# Patient Record
Sex: FEMALE | URBAN - METROPOLITAN AREA
[De-identification: names, ages, dates, MRNs, and addresses within clinical notes are randomized per-mention and may not be internally consistent; named-entity substitution may affect disease eponyms.]

---

## 2017-06-13 ENCOUNTER — APPOINTMENT (RX ONLY)
Dept: URBAN - METROPOLITAN AREA CLINIC 75 | Facility: CLINIC | Age: 47
Setting detail: DERMATOLOGY
End: 2017-06-13

## 2017-06-13 VITALS — HEIGHT: 64 IN | WEIGHT: 145 LBS

## 2017-06-13 DIAGNOSIS — Z71.89 OTHER SPECIFIED COUNSELING: ICD-10-CM

## 2017-06-13 DIAGNOSIS — L82.1 OTHER SEBORRHEIC KERATOSIS: ICD-10-CM

## 2017-06-13 DIAGNOSIS — L81.4 OTHER MELANIN HYPERPIGMENTATION: ICD-10-CM

## 2017-06-13 DIAGNOSIS — L81.1 CHLOASMA: ICD-10-CM

## 2017-06-13 DIAGNOSIS — Z41.9 ENCOUNTER FOR PROCEDURE FOR PURPOSES OTHER THAN REMEDYING HEALTH STATE, UNSPECIFIED: ICD-10-CM

## 2017-06-13 DIAGNOSIS — D22 MELANOCYTIC NEVI: ICD-10-CM

## 2017-06-13 PROBLEM — D23.72 OTHER BENIGN NEOPLASM OF SKIN OF LEFT LOWER LIMB, INCLUDING HIP: Status: ACTIVE | Noted: 2017-06-13

## 2017-06-13 PROBLEM — D22.5 MELANOCYTIC NEVI OF TRUNK: Status: ACTIVE | Noted: 2017-06-13

## 2017-06-13 PROBLEM — L20.84 INTRINSIC (ALLERGIC) ECZEMA: Status: ACTIVE | Noted: 2017-06-13

## 2017-06-13 PROBLEM — E78.5 HYPERLIPIDEMIA, UNSPECIFIED: Status: ACTIVE | Noted: 2017-06-13

## 2017-06-13 PROCEDURE — ? COUNSELING

## 2017-06-13 PROCEDURE — ? RECOMMENDATIONS

## 2017-06-13 PROCEDURE — ? TREATMENT REGIMEN

## 2017-06-13 PROCEDURE — ? IN-HOUSE DISPENSING PHARMACY

## 2017-06-13 PROCEDURE — 99203 OFFICE O/P NEW LOW 30 MIN: CPT

## 2017-06-13 PROCEDURE — ? BOTOX

## 2017-06-13 ASSESSMENT — LOCATION DETAILED DESCRIPTION DERM
LOCATION DETAILED: INFERIOR THORACIC SPINE
LOCATION DETAILED: RIGHT INFERIOR CENTRAL MALAR CHEEK
LOCATION DETAILED: RIGHT SUPERIOR MEDIAL UPPER BACK

## 2017-06-13 ASSESSMENT — LOCATION ZONE DERM
LOCATION ZONE: TRUNK
LOCATION ZONE: FACE

## 2017-06-13 ASSESSMENT — LOCATION SIMPLE DESCRIPTION DERM
LOCATION SIMPLE: RIGHT UPPER BACK
LOCATION SIMPLE: RIGHT CHEEK
LOCATION SIMPLE: UPPER BACK

## 2017-06-13 NOTE — PROCEDURE: IN-HOUSE DISPENSING PHARMACY
Product 53 Units Dispensed: 0
Product 1 Unit Type: mg
Name Of Product 1: Acne Body Wash - 061570
Product 42 Application Directions: Apply to hyperpigmented area in the evening after moisturizer.
Name Of Product 2: Tret 0.05% Cream - [id number]
Product 21 Application Directions: Apply to affected area in the evening or every other evening.
Product 1 Price/Unit (In Dollars): 40.00
Product 21 Price/Unit (In Dollars): 50.00
Render Product Pricing In Note: Yes
Product 41 Units Dispensed: 1
Name Of Product 51: Anti- Fungal Nail Solution - 279910
Name Of Product 4: Acne Gel w/ Dapsone - 103402
Product 12 Application Directions: Apply to affected area two times a day.
Name Of Product 31: Ivermec 1% / Met 1% Gel - 922287
Product 31 Application Directions: Apply to affected area before moisturizer one time a day.
Name Of Product 42: Kojic Melasma Cream - 174145
Product 7 Application Directions: Apply to affected area one time a day.
Name Of Product 5: Clind / Tret Combo Cream - 316278
Product 2 Application Directions: Apply to affected area in the evening after moisturizer.  Avoid eyelids.
Name Of Product 7: Sod Sulf 10% / Sulf 2% - 786632
Name Of Product 8: Taza 0.1% Cream - 843272
Name Of Product 13: Clob 0.05% Cream - 085043
Product 1 Application Directions: Use as face or body wash daily.
Product 8 Application Directions: Apply to affected area in the evening after moisturizer.  Avoid eyelids.
Detail Level: Zone
Product 51 Application Directions: Apply to affected nails daily for 10 months.
Name Of Product 35: Tacro 0.1% Ointment - 780469
Name Of Product 41: Hydroquin 6% Combo Cream - 532020
Name Of Product 6: Adap Combo Cream - 571970
Product 3 Application Directions: Apply to acne prone area after moisturizer one time a day.
Product 5 Application Directions: Apply to affected area in evening after moisturizer. Avoid eyelids.
Name Of Product 21: Imiqui 5% / Levo 1% Gel - 148731
Name Of Product 11: Clob 0.05% Solution - 447307
Name Of Product 3: Tiago / Clind Combo - 486423
Name Of Product 24: Triamcin 1% Ointment - 667316
Name Of Product 12: Iodoquin / Darrius Combo - 991199
Name Of Product 14: Dermatitis Topical Foam - 590809

## 2017-06-13 NOTE — PROCEDURE: BOTOX
Glabellar Complex Units: 20
Detail Level: Detailed
Additional Area 6 Units: 0
Lot #: [id number]
Post-Care Instructions: Patient instructed to not lie down for 4 hours and limit physical activity for 24 hours. Patient instructed not to travel by airplane for 48 hours.
Forehead Units: 12
Consent: Written consent obtained. Risks include but not limited to lid/brow ptosis, bruising, swelling, diplopia, temporary effect, incomplete chemical denervation.
Dilution (U/0.1 Cc): 4
Expiration Date (Month Year): 8/19
Price (Use Numbers Only, No Special Characters Or $): 12.00

## 2017-06-13 NOTE — PROCEDURE: RECOMMENDATIONS
Detail Level: Zone
Recommendations (Free Text): [place]roneedling
Recommendation Preamble: The following recommendations were made during the visit:  juvederm ultra plua for tear trough and nl folds for malar area

## 2017-09-28 ENCOUNTER — APPOINTMENT (RX ONLY)
Dept: URBAN - METROPOLITAN AREA CLINIC 75 | Facility: CLINIC | Age: 47
Setting detail: DERMATOLOGY
End: 2017-09-28

## 2017-09-28 DIAGNOSIS — Z41.9 ENCOUNTER FOR PROCEDURE FOR PURPOSES OTHER THAN REMEDYING HEALTH STATE, UNSPECIFIED: ICD-10-CM

## 2017-09-28 PROCEDURE — ? BOTOX

## 2017-09-28 NOTE — PROCEDURE: BOTOX
Price (Use Numbers Only, No Special Characters Or $): 12.00
Additional Area 5 Units: 0
Post-Care Instructions: Patient instructed to not lie down for 4 hours and limit physical activity for 24 hours. Patient instructed not to travel by airplane for 48 hours.
Glabellar Complex Units: 24
Expiration Date (Month Year): 8/19
Consent: Written consent obtained. Risks include but not limited to lid/brow ptosis, bruising, swelling, diplopia, temporary effect, incomplete chemical denervation.
Detail Level: Detailed
Lot #: [id number]
Dilution (U/0.1 Cc): 4

## 2017-10-04 ENCOUNTER — APPOINTMENT (RX ONLY)
Dept: URBAN - METROPOLITAN AREA CLINIC 75 | Facility: CLINIC | Age: 47
Setting detail: DERMATOLOGY
End: 2017-10-04

## 2017-10-04 DIAGNOSIS — L98.8 OTHER SPECIFIED DISORDERS OF THE SKIN AND SUBCUTANEOUS TISSUE: ICD-10-CM

## 2017-10-04 PROBLEM — L90.8 OTHER ATROPHIC DISORDERS OF SKIN: Status: ACTIVE | Noted: 2017-10-04

## 2017-10-04 PROBLEM — L55.1 SUNBURN OF SECOND DEGREE: Status: ACTIVE | Noted: 2017-10-04

## 2017-10-04 PROCEDURE — ? VOLBELLA INJECTION

## 2017-10-04 PROCEDURE — ? JUVEDERM ULTRA PLUS XC INJECTION

## 2017-10-04 NOTE — PROCEDURE: VOLBELLA INJECTION
Additional Area 1 Volume In Cc: 0.6
Filler: Juvederm Volbella XC
Additional Area 2 Volume In Cc: 0
Use Map Statement For Sites (Optional): No
Additional Area 1 Location: lips, upper and lower
Additional Anesthesia Volume In Cc: 6
Map Statment: See Attach Map for Complete Details
Procedural Text: The filler was administered to the treatment areas noted above.
Post-Care Instructions: Patient instructed to apply ice to reduce swelling.
Detail Level: Detailed
Consent: Written consent obtained. Risks include but not limited to bruising, beading, irregular texture, ulceration, infection, allergic reaction, scar formation, incomplete augmentation, temporary nature, procedural pain.
Anesthesia Volume In Cc: 0.5
Topical Anesthesia?: BLT cream (benzocaine 20%, lidocaine 6%, tetracaine 4%)
Expiration Date (Month Year): 2-10-19
Lot #: [id number]

## 2017-10-04 NOTE — PROCEDURE: JUVEDERM ULTRA PLUS XC INJECTION
Map Statment: See Attach Map for Complete Details
Detail Level: Detailed
Jawline Filler Volume In Cc: 0
Consent: Written consent obtained. Risks include but not limited to bruising, beading, irregular texture, ulceration, infection, allergic reaction, scar formation, incomplete augmentation, temporary nature, procedural pain.
Nasolabial Folds Filler Volume In Cc: 0.2
Anesthesia Volume In Cc: 0.5
Procedural Text: The filler was administered to the treatment areas noted above.
Additional Area 1 Location: glabella
Use Map Statement For Sites (Optional): No
Lot #: [id number]
Filler: Juvederm Ultra Plus XC
Expiration Date (Month Year): 8/3/18
Post-Care Instructions: Patient instructed to apply ice to reduce swelling.
Topical Anesthesia?: BLT cream (benzocaine 20%, lidocaine 6%, tetracaine 4%)
Additional Anesthesia Volume In Cc: 6
Tear Troughs Filler  Volume In Cc: 0.4

## 2018-02-27 ENCOUNTER — APPOINTMENT (RX ONLY)
Dept: URBAN - METROPOLITAN AREA CLINIC 75 | Facility: CLINIC | Age: 48
Setting detail: DERMATOLOGY
End: 2018-02-27

## 2018-02-27 DIAGNOSIS — Z41.9 ENCOUNTER FOR PROCEDURE FOR PURPOSES OTHER THAN REMEDYING HEALTH STATE, UNSPECIFIED: ICD-10-CM

## 2018-02-27 PROCEDURE — ? BOTOX

## 2018-02-27 NOTE — PROCEDURE: BOTOX
Price (Use Numbers Only, No Special Characters Or $): 12.00
Glabellar Complex Units: 24
Nasal Root Units: 0
Lot #: [id number]
Additional Area 1 Location: ivonne
Detail Level: Detailed
Expiration Date (Month Year): 9/20
Dilution (U/0.1 Cc): 4
Consent: Written consent obtained. Risks include but not limited to lid/brow ptosis, bruising, swelling, diplopia, temporary effect, incomplete chemical denervation.
Additional Area 2 Location: lateral brows
Post-Care Instructions: Patient instructed to not lie down for 4 hours and limit physical activity for 24 hours. Patient instructed not to travel by airplane for 48 hours.

## 2018-06-26 ENCOUNTER — APPOINTMENT (RX ONLY)
Dept: URBAN - METROPOLITAN AREA CLINIC 75 | Facility: CLINIC | Age: 48
Setting detail: DERMATOLOGY
End: 2018-06-26

## 2018-06-26 DIAGNOSIS — Z41.9 ENCOUNTER FOR PROCEDURE FOR PURPOSES OTHER THAN REMEDYING HEALTH STATE, UNSPECIFIED: ICD-10-CM

## 2018-06-26 PROCEDURE — ? BOTOX

## 2018-06-26 NOTE — PROCEDURE: BOTOX
Price (Use Numbers Only, No Special Characters Or $): 12.00
Additional Area 5 Units: 0
Lot #: [id number]
Detail Level: Detailed
Post-Care Instructions: Patient instructed to not lie down for 4 hours and limit physical activity for 24 hours. Patient instructed not to travel by airplane for 48 hours.
Consent: Written consent obtained. Risks include but not limited to lid/brow ptosis, bruising, swelling, diplopia, temporary effect, incomplete chemical denervation.
Dilution (U/0.1 Cc): 2.5
Additional Area 2 Location: lateral brows
Glabellar Complex Units: 24
Additional Area 1 Location: ivonne
Expiration Date (Month Year): 12/20

## 2018-09-06 ENCOUNTER — APPOINTMENT (RX ONLY)
Dept: URBAN - METROPOLITAN AREA CLINIC 75 | Facility: CLINIC | Age: 48
Setting detail: DERMATOLOGY
End: 2018-09-06

## 2018-09-06 DIAGNOSIS — Z41.9 ENCOUNTER FOR PROCEDURE FOR PURPOSES OTHER THAN REMEDYING HEALTH STATE, UNSPECIFIED: ICD-10-CM

## 2018-09-06 PROBLEM — L85.3 XEROSIS CUTIS: Status: ACTIVE | Noted: 2018-09-06

## 2018-09-06 PROCEDURE — ? FILLERS

## 2018-09-06 PROCEDURE — ? BOTOX

## 2018-09-06 NOTE — PROCEDURE: BOTOX
Additional Area 3 Units: 0
Glabellar Complex Units: 12
Expiration Date (Month Year): 11/20
Additional Area 2 Location: lateral brows
Additional Area 1 Location: ivonne
Price (Use Numbers Only, No Special Characters Or $): 0.00
Lot #: [id number] - Sample
Post-Care Instructions: Patient instructed to not lie down for 4 hours and limit physical activity for 24 hours. Patient instructed not to travel by airplane for 48 hours.
Consent: Written consent obtained. Risks include but not limited to lid/brow ptosis, bruising, swelling, diplopia, temporary effect, incomplete chemical denervation.
Detail Level: Detailed
Dilution (U/0.1 Cc): 2.5

## 2019-02-12 ENCOUNTER — APPOINTMENT (RX ONLY)
Dept: URBAN - METROPOLITAN AREA CLINIC 52 | Facility: CLINIC | Age: 49
Setting detail: DERMATOLOGY
End: 2019-02-12

## 2019-02-12 DIAGNOSIS — Z41.9 ENCOUNTER FOR PROCEDURE FOR PURPOSES OTHER THAN REMEDYING HEALTH STATE, UNSPECIFIED: ICD-10-CM

## 2019-02-12 PROBLEM — L20.84 INTRINSIC (ALLERGIC) ECZEMA: Status: ACTIVE | Noted: 2019-02-12

## 2019-02-12 PROBLEM — E78.5 HYPERLIPIDEMIA, UNSPECIFIED: Status: ACTIVE | Noted: 2019-02-12

## 2019-02-12 PROBLEM — L55.1 SUNBURN OF SECOND DEGREE: Status: ACTIVE | Noted: 2019-02-12

## 2019-02-12 PROBLEM — L85.3 XEROSIS CUTIS: Status: ACTIVE | Noted: 2019-02-12

## 2019-02-12 PROCEDURE — ? BOTOX

## 2019-02-12 ASSESSMENT — LOCATION DETAILED DESCRIPTION DERM: LOCATION DETAILED: SUPERIOR MID FOREHEAD

## 2019-02-12 ASSESSMENT — LOCATION ZONE DERM: LOCATION ZONE: FACE

## 2019-02-12 ASSESSMENT — LOCATION SIMPLE DESCRIPTION DERM: LOCATION SIMPLE: SUPERIOR FOREHEAD

## 2019-02-12 NOTE — PROCEDURE: BOTOX
Lot #: [id number]
Detail Level: Detailed
Anterior Platysmal Bands Units: 0
Consent: Written consent obtained. Risks include but not limited to lid/brow ptosis, bruising, swelling, diplopia, temporary effect, incomplete chemical denervation.
Expiration Date (Month Year): 06/21
Dilution (U/0.1 Cc): 2.5
Glabellar Complex Units: 24
Post-Care Instructions: Patient instructed to not lie down for 4 hours and limit physical activity for 24 hours. Patient instructed not to travel by airplane for 48 hours.
Forehead Units: 10
Price (Use Numbers Only, No Special Characters Or $): 12

## 2019-07-10 ENCOUNTER — APPOINTMENT (RX ONLY)
Dept: URBAN - METROPOLITAN AREA CLINIC 52 | Facility: CLINIC | Age: 49
Setting detail: DERMATOLOGY
End: 2019-07-10

## 2019-07-10 DIAGNOSIS — Z41.9 ENCOUNTER FOR PROCEDURE FOR PURPOSES OTHER THAN REMEDYING HEALTH STATE, UNSPECIFIED: ICD-10-CM

## 2019-07-10 PROCEDURE — ? COSMETIC CONSULTATION: FILLERS

## 2019-07-10 PROCEDURE — ? RECOMMENDATIONS

## 2019-07-10 PROCEDURE — ? PRESCRIPTION

## 2019-07-10 NOTE — PROCEDURE: RECOMMENDATIONS
Recommendation Preamble: The following recommendations were made during the visit:
Recommendations (Free Text): Vollure in the tear troughs and juvederm ultra plus in the glabella and marionette
Detail Level: Zone

## 2019-08-14 ENCOUNTER — APPOINTMENT (RX ONLY)
Dept: URBAN - METROPOLITAN AREA CLINIC 52 | Facility: CLINIC | Age: 49
Setting detail: DERMATOLOGY
End: 2019-08-14

## 2019-08-14 DIAGNOSIS — Z41.9 ENCOUNTER FOR PROCEDURE FOR PURPOSES OTHER THAN REMEDYING HEALTH STATE, UNSPECIFIED: ICD-10-CM

## 2019-08-14 PROCEDURE — ? JUVEDERM VOLBELLA INJECTION

## 2019-08-14 NOTE — PROCEDURE: JUVEDERM VOLBELLA INJECTION
Dorsal Hands Filler  Volume In Cc: 0
Map Statment: See Attach Map for Complete Details
Detail Level: Detailed
Anesthesia Volume In Cc: 0.5
Consent: Written consent obtained. Risks include but not limited to bruising, beading, irregular texture, ulceration, infection, allergic reaction, scar formation, incomplete augmentation, temporary nature, procedural pain.
Additional Area 1 Location: glabella
Lot #: [id number]
Filler: Juvederm Volbella
Price (Use Numbers Only, No Special Characters Or $): 450
Post-Care Instructions: Patient instructed to apply ice to reduce swelling.
Additional Area 1 Volume In Cc: 0.2
Procedural Text: The filler was administered to the treatment areas noted above.
Include Cannula Information In Note?: No
Anesthesia Type: 1% lidocaine with epinephrine
Tear Troughs Filler  Volume In Cc: 0.4
Additional Anesthesia Volume In Cc: 6
Expiration Date (Month Year): 06/2020

## 2020-10-12 NOTE — PROCEDURE: FILLERS
Please call and schedule flu vaccine.
Brows Filler  Volume In Cc: 0
Additional Area 1 Location: glabella
Additional Area 2 Location: lip augmentation
Lot #: [id number]
Additional Area 2 Volume In Cc: 0.4
Expiration Date (Month Year): 6/3/2020
Map Statment: See Attach Map for Complete Details
Anesthesia Type: 1% lidocaine with epinephrine
Post-Care Instructions: Patient instructed to apply ice to reduce swelling.
Include Cannula Information In Note?: No
Detail Level: Detailed
Filler: Juvederm Ultra Plus XC
Additional Area 1 Volume In Cc: 0.2
Anesthesia Volume In Cc: 0.5
Filler: Volbella
Lot #: [id number]
Consent: Written consent obtained. Risks include but not limited to bruising, beading, irregular texture, ulceration, infection, allergic reaction, scar formation, incomplete augmentation, temporary nature, procedural pain.
Additional Anesthesia Volume In Cc: 6